# Patient Record
Sex: FEMALE | Race: OTHER | Employment: UNEMPLOYED | URBAN - METROPOLITAN AREA
[De-identification: names, ages, dates, MRNs, and addresses within clinical notes are randomized per-mention and may not be internally consistent; named-entity substitution may affect disease eponyms.]

---

## 2024-08-04 ENCOUNTER — HOSPITAL ENCOUNTER (EMERGENCY)
Age: 29
Discharge: HOME | End: 2024-08-04

## 2024-08-04 VITALS
DIASTOLIC BLOOD PRESSURE: 88 MMHG | RESPIRATION RATE: 18 BRPM | OXYGEN SATURATION: 99 % | WEIGHT: 131.9 LBS | HEART RATE: 72 BPM | SYSTOLIC BLOOD PRESSURE: 143 MMHG | TEMPERATURE: 98.6 F

## 2024-08-04 DIAGNOSIS — L70.0 ACNE VULGARIS: Primary | ICD-10-CM

## 2024-08-04 PROCEDURE — 99283 EMERGENCY DEPT VISIT LOW MDM: CPT

## 2024-08-04 RX ORDER — ERYTHROMYCIN AND BENZOYL PEROXIDE 30; 50 MG/G; MG/G
GEL TOPICAL
Qty: 23.3 G | Refills: 0 | Status: SHIPPED | OUTPATIENT
Start: 2024-08-04 | End: 2024-09-03

## 2024-08-04 NOTE — ED PROVIDER NOTES
mucosal examination was unremarkable.  She is afebrile and nontachycardic.    Patient will be discharged with benzyl peroxide erythromycin gel to apply to the area and to follow-up with dermatology.     Low suspicion for angioedema, anaphylaxis, cellulitis, abscess, folliculitis, necrotizing fasciitis, SJS, HSP, TEN, scabies, bedbugs, varicella, milia, tinea, erysipelas, scalded skin syndrome, meningococcemia, occult bacteremia or other emergent etiology at this time    Appropriate for outpatient management        I am the Primary Clinician of Record.    FINAL IMPRESSION      1. Acne vulgaris          DISPOSITION/PLAN     DISPOSITION Decision To Discharge 08/04/2024 01:29:18 PM      PATIENT REFERRED TO:  DANNIE WATERS  Dermatology  [address] [address]  [phone]  Schedule an appointment as soon as possible for a visit in 1 week  recheck    [facility]  [address] [address]  [phone]    As needed, If symptoms worsen    [facility]  [address]  [place] [address]  [phone]          DISCHARGE MEDICATIONS:  Discharge Medication List as of 8/4/2024  1:40 PM        START taking these medications    Details   benzoyl peroxide-erythromycin (BENZAMYCIN) 5-3 % gel Apply topically 2 times daily., Disp-23.3 g, R-0, Print             DISCONTINUED MEDICATIONS:  Discharge Medication List as of 8/4/2024  1:40 PM                 (Please note that portions of this note were completed with a voice recognition program.  Efforts were made to edit the dictations but occasionally words are mis-transcribed.)    SUGAR rOtiz (electronically signed)            Rudi Ramos PA  08/04/24 [id number]